# Patient Record
Sex: FEMALE | Race: WHITE
[De-identification: names, ages, dates, MRNs, and addresses within clinical notes are randomized per-mention and may not be internally consistent; named-entity substitution may affect disease eponyms.]

---

## 2018-05-22 ENCOUNTER — HOSPITAL ENCOUNTER (OUTPATIENT)
Dept: HOSPITAL 74 - JASU-SURG | Age: 48
Discharge: HOME | End: 2018-05-22
Attending: OBSTETRICS & GYNECOLOGY
Payer: COMMERCIAL

## 2018-05-22 VITALS — DIASTOLIC BLOOD PRESSURE: 70 MMHG | SYSTOLIC BLOOD PRESSURE: 110 MMHG | HEART RATE: 86 BPM

## 2018-05-22 VITALS — TEMPERATURE: 98.4 F

## 2018-05-22 VITALS — BODY MASS INDEX: 21.9 KG/M2

## 2018-05-22 DIAGNOSIS — N84.0: ICD-10-CM

## 2018-05-22 DIAGNOSIS — D25.9: ICD-10-CM

## 2018-05-22 DIAGNOSIS — N92.1: Primary | ICD-10-CM

## 2018-05-22 PROCEDURE — 0UDB7ZX EXTRACTION OF ENDOMETRIUM, VIA NATURAL OR ARTIFICIAL OPENING, DIAGNOSTIC: ICD-10-PCS | Performed by: OBSTETRICS & GYNECOLOGY

## 2018-05-22 PROCEDURE — 0U5B8ZZ DESTRUCTION OF ENDOMETRIUM, VIA NATURAL OR ARTIFICIAL OPENING ENDOSCOPIC: ICD-10-PCS | Performed by: OBSTETRICS & GYNECOLOGY

## 2018-05-22 PROCEDURE — 0UB97ZX EXCISION OF UTERUS, VIA NATURAL OR ARTIFICIAL OPENING, DIAGNOSTIC: ICD-10-PCS | Performed by: OBSTETRICS & GYNECOLOGY

## 2018-05-22 NOTE — HP
History & Physical Update





- History


History: No Change





- Physical


Physical: No Change





- Assessment


Assessment: No Change





- Plan


Plan: No Change (Consent signed and witnessed H&P c/w 5/3/18)

## 2018-05-22 NOTE — OP
Operative Note





- Note:


Operative Date: 05/22/18


Pre-Operative Diagnosis: 49yo P2 with Metrorrhagia, thick endometrium


Operation: Hysteroscopy, Myomectomy, Polypectomy, D&C, Endometrial ablation


Findings: 





Overgrown Endometrium with Fibroids and Polyps


Resected with TruClear device


Bilateral Ostia visualized 


Completed HTA - Genesys cycle


Post-Operative Diagnosis: Same as Pre-op


Surgeon: Bee Claudio


Anesthesiologist/CRNA: Bishnu Quiles


Anesthesia: General


Specimens Removed: Fibroid, Polyp, Endometrium curettings


Estimated Blood Loss (mls): 15


Instrument used (Debridements only): TruClear, HTA/Genesys


Drains & Tubes with Location: Fluid deficit 560cc


Drains, Volume Out (mls): 200


Fluid Volume Replaced (mls): 750


Operative Report Dictated: Yes

## 2018-05-23 NOTE — PATH
Surgical Pathology Report



Patient Name:  RICHARDSON BOWERS

Accession #:  [id number]

[facility]. Rec. #:  [id number]                                                        

   /Age/Gender:  1970 (Age: 48) / F

Account:  [id number]                                                          

             Location: [facility]

Taken:  2018

Received:  2018

Reported:  2018

Physicians:  BEKAH Son M.D.

  



Specimen(s) Received

 ENDOMETRIAL POLYPS POSSIBLE FIBROIDS, ENDOMETRIAL CURETTINGS 





Clinical History

Endometrial thickening







Final Diagnosis

ENDOMETRIAL POLYPS, POSSIBLE FIBROIDS, POLYPECTOMY AND CURETTAGE:

FRAGMENTS OF ENDOMETRIAL POLYP (S) AND SECRETORY ENDOMETRIUM.





***Electronically Signed***

Shannon Taylor M.D.





Gross Description

Received in formalin labeled "endometrial polyps possible, fibroids, endometrial

curettings," is a 4.0 x 3.8 x 0.4 cm aggregate of tan soft tissue fragments. The

formalin is filtered and the specimen is entirely submitted in 4 cassettes.

/2018



saudi/[email or web address]

## 2018-06-02 NOTE — OP
DATE OF OPERATION:  05/22/2018

 

PREOPERATIVE DIAGNOSIS:  A 48-year-old para 2 with metrorrhagia, thick 
endometrium.

 

POSTOPERATIVE DIAGNOSIS:  A 48-year-old para 2 with metrorrhagia, thick 
endometrium.

 

OPERATION:  Hysteroscopy, myomectomy, polypectomy, dilation and curettage,

endometrial ablation.  

 

FINDINGS:  Overgrown endometrium with small fibroids and polyps resected with

TruClear device.  Bilateral ostia visualized.  Completed HTA Genesys cycle.

 

SURGEON:  Bee Claudio MD 

 

ANESTHESIOLOGIST:  Bishnu Quiles 

 

ANESTHESIA:  General, sedation.

 

SPECIMENS REMOVED:  

1.  Fibroid polyp.

2.  Endometrial curettings.

 

DESCRIPTION OF OPERATIVE PROCEDURE:  After ensuring informed consent, the 
patient was

brought to the operating room where she was placed in dorsal lithotomy 
position.  The

perineum and vagina were prepped and draped in sterile fashion.  Since 
retractors

were used to visualized cervix, an anterior lip of the cervix was articulated 
with

single-tooth tenaculum.  Cervix was gradually dilated and sized up to 19 gauge 
with

dilators to accommodate 5-mm hysteroscope.  Hysteroscope was introduced

atraumatically with the above findings.  TruClear device was inserted through 
the

surgical port, and endometrium, polyps, and fibroids were resected using 
TruClear

device.  Subsequently, hysteroscope was removed, and new hysteroscope was 
introduced

in order to introduce Genesys system.  A hysteroscopy was performed one more 
time. 

Intact cavity was confirmed, and 10-minute HTA cycle was completed.  A 2-minute

cooling cycle was completed as well.  All instruments subsequently were removed 
from

the cervix and vagina.  Vagina was found to be intact without any problems. 

Estimated blood loss was 15 mL.  Instruments used were TruClear and HTA 
Genesys. 

Fluid deficit was 560 mL.  Urine output was 200 mL.  The patient received 700 
mL of

IV fluids.  The patient was extubated and brought stable extubated in the 
recovery

room.  All instruments and sponges were correct x2.  

 

 

 

BALDO [id number]

DD: 06/02/2018 16:53

DT: 06/02/2018 17:16

Job #:  [id number]

MTDD